# Patient Record
Sex: MALE | Race: WHITE | Employment: OTHER | URBAN - METROPOLITAN AREA
[De-identification: names, ages, dates, MRNs, and addresses within clinical notes are randomized per-mention and may not be internally consistent; named-entity substitution may affect disease eponyms.]

---

## 2020-03-23 ENCOUNTER — HOSPITAL ENCOUNTER (INPATIENT)
Facility: CLINIC | Age: 80
LOS: 3 days | Discharge: HOME HEALTH | DRG: 195 | End: 2020-03-26
Attending: EMERGENCY MEDICINE | Admitting: HOSPITALIST
Payer: COMMERCIAL

## 2020-03-23 ENCOUNTER — APPOINTMENT (OUTPATIENT)
Dept: GENERAL RADIOLOGY | Facility: CLINIC | Age: 80
DRG: 195 | End: 2020-03-23
Attending: EMERGENCY MEDICINE
Payer: COMMERCIAL

## 2020-03-23 DIAGNOSIS — J18.9 PNEUMONIA OF LEFT LOWER LOBE DUE TO INFECTIOUS ORGANISM: ICD-10-CM

## 2020-03-23 DIAGNOSIS — J18.9 COMMUNITY ACQUIRED PNEUMONIA OF LEFT LOWER LOBE OF LUNG: Primary | ICD-10-CM

## 2020-03-23 LAB
ANION GAP SERPL CALCULATED.3IONS-SCNC: 3 MMOL/L (ref 3–14)
BASOPHILS # BLD AUTO: 0 10E9/L (ref 0–0.2)
BASOPHILS NFR BLD AUTO: 0 %
BUN SERPL-MCNC: 16 MG/DL (ref 7–30)
CALCIUM SERPL-MCNC: 8.4 MG/DL (ref 8.5–10.1)
CHLORIDE SERPL-SCNC: 103 MMOL/L (ref 94–109)
CO2 SERPL-SCNC: 29 MMOL/L (ref 20–32)
CREAT SERPL-MCNC: 0.98 MG/DL (ref 0.66–1.25)
DIFFERENTIAL METHOD BLD: ABNORMAL
EOSINOPHIL # BLD AUTO: 0 10E9/L (ref 0–0.7)
EOSINOPHIL NFR BLD AUTO: 0 %
ERYTHROCYTE [DISTWIDTH] IN BLOOD BY AUTOMATED COUNT: 13.9 % (ref 10–15)
FLUAV+FLUBV AG SPEC QL: NEGATIVE
FLUAV+FLUBV AG SPEC QL: NEGATIVE
GFR SERPL CREATININE-BSD FRML MDRD: 72 ML/MIN/{1.73_M2}
GLUCOSE SERPL-MCNC: 115 MG/DL (ref 70–99)
HCT VFR BLD AUTO: 39.7 % (ref 40–53)
HGB BLD-MCNC: 13.4 G/DL (ref 13.3–17.7)
INTERPRETATION ECG - MUSE: NORMAL
LACTATE BLD-SCNC: 1.1 MMOL/L (ref 0.7–2)
LYMPHOCYTES # BLD AUTO: 1.3 10E9/L (ref 0.8–5.3)
LYMPHOCYTES NFR BLD AUTO: 12 %
MCH RBC QN AUTO: 34.3 PG (ref 26.5–33)
MCHC RBC AUTO-ENTMCNC: 33.8 G/DL (ref 31.5–36.5)
MCV RBC AUTO: 102 FL (ref 78–100)
MONOCYTES # BLD AUTO: 1.6 10E9/L (ref 0–1.3)
MONOCYTES NFR BLD AUTO: 14 %
NEUTROPHILS # BLD AUTO: 8.3 10E9/L (ref 1.6–8.3)
NEUTROPHILS NFR BLD AUTO: 74 %
PLATELET # BLD AUTO: 151 10E9/L (ref 150–450)
PLATELET # BLD EST: ABNORMAL 10*3/UL
POTASSIUM SERPL-SCNC: 4 MMOL/L (ref 3.4–5.3)
RBC # BLD AUTO: 3.91 10E12/L (ref 4.4–5.9)
RBC MORPH BLD: ABNORMAL
SODIUM SERPL-SCNC: 135 MMOL/L (ref 133–144)
SPECIMEN SOURCE: NORMAL
WBC # BLD AUTO: 11.2 10E9/L (ref 4–11)

## 2020-03-23 PROCEDURE — 25800030 ZZH RX IP 258 OP 636: Performed by: EMERGENCY MEDICINE

## 2020-03-23 PROCEDURE — 83605 ASSAY OF LACTIC ACID: CPT | Performed by: EMERGENCY MEDICINE

## 2020-03-23 PROCEDURE — 87040 BLOOD CULTURE FOR BACTERIA: CPT | Performed by: EMERGENCY MEDICINE

## 2020-03-23 PROCEDURE — 99285 EMERGENCY DEPT VISIT HI MDM: CPT | Mod: 25

## 2020-03-23 PROCEDURE — 96368 THER/DIAG CONCURRENT INF: CPT

## 2020-03-23 PROCEDURE — 71045 X-RAY EXAM CHEST 1 VIEW: CPT

## 2020-03-23 PROCEDURE — 80048 BASIC METABOLIC PNL TOTAL CA: CPT | Performed by: EMERGENCY MEDICINE

## 2020-03-23 PROCEDURE — 93005 ELECTROCARDIOGRAM TRACING: CPT

## 2020-03-23 PROCEDURE — U0001 2019-NCOV DIAGNOSTIC P: HCPCS | Performed by: EMERGENCY MEDICINE

## 2020-03-23 PROCEDURE — 25000128 H RX IP 250 OP 636: Performed by: EMERGENCY MEDICINE

## 2020-03-23 PROCEDURE — 96365 THER/PROPH/DIAG IV INF INIT: CPT

## 2020-03-23 PROCEDURE — 12000000 ZZH R&B MED SURG/OB

## 2020-03-23 PROCEDURE — 96361 HYDRATE IV INFUSION ADD-ON: CPT

## 2020-03-23 PROCEDURE — 36415 COLL VENOUS BLD VENIPUNCTURE: CPT

## 2020-03-23 PROCEDURE — 87804 INFLUENZA ASSAY W/OPTIC: CPT | Performed by: EMERGENCY MEDICINE

## 2020-03-23 PROCEDURE — 85025 COMPLETE CBC W/AUTO DIFF WBC: CPT | Performed by: EMERGENCY MEDICINE

## 2020-03-23 PROCEDURE — 99223 1ST HOSP IP/OBS HIGH 75: CPT | Mod: AI | Performed by: HOSPITALIST

## 2020-03-23 RX ORDER — AMOXICILLIN 250 MG
2 CAPSULE ORAL 2 TIMES DAILY PRN
Status: DISCONTINUED | OUTPATIENT
Start: 2020-03-23 | End: 2020-03-26 | Stop reason: HOSPADM

## 2020-03-23 RX ORDER — GUAIFENESIN/DEXTROMETHORPHAN 100-10MG/5
10 SYRUP ORAL EVERY 4 HOURS PRN
Status: DISCONTINUED | OUTPATIENT
Start: 2020-03-23 | End: 2020-03-26 | Stop reason: HOSPADM

## 2020-03-23 RX ORDER — PROCHLORPERAZINE 25 MG
12.5 SUPPOSITORY, RECTAL RECTAL EVERY 12 HOURS PRN
Status: DISCONTINUED | OUTPATIENT
Start: 2020-03-23 | End: 2020-03-26 | Stop reason: HOSPADM

## 2020-03-23 RX ORDER — PROCHLORPERAZINE MALEATE 5 MG
5 TABLET ORAL EVERY 6 HOURS PRN
Status: DISCONTINUED | OUTPATIENT
Start: 2020-03-23 | End: 2020-03-26 | Stop reason: HOSPADM

## 2020-03-23 RX ORDER — ACETAMINOPHEN 325 MG/1
650 TABLET ORAL EVERY 4 HOURS PRN
Status: DISCONTINUED | OUTPATIENT
Start: 2020-03-23 | End: 2020-03-26 | Stop reason: HOSPADM

## 2020-03-23 RX ORDER — AZITHROMYCIN 500 MG/1
500 INJECTION, POWDER, LYOPHILIZED, FOR SOLUTION INTRAVENOUS ONCE
Status: COMPLETED | OUTPATIENT
Start: 2020-03-23 | End: 2020-03-23

## 2020-03-23 RX ORDER — LIDOCAINE 40 MG/G
CREAM TOPICAL
Status: DISCONTINUED | OUTPATIENT
Start: 2020-03-23 | End: 2020-03-26 | Stop reason: HOSPADM

## 2020-03-23 RX ORDER — AMOXICILLIN 250 MG
1 CAPSULE ORAL 2 TIMES DAILY PRN
Status: DISCONTINUED | OUTPATIENT
Start: 2020-03-23 | End: 2020-03-26 | Stop reason: HOSPADM

## 2020-03-23 RX ORDER — BISACODYL 10 MG
10 SUPPOSITORY, RECTAL RECTAL DAILY PRN
Status: DISCONTINUED | OUTPATIENT
Start: 2020-03-23 | End: 2020-03-26 | Stop reason: HOSPADM

## 2020-03-23 RX ORDER — NALOXONE HYDROCHLORIDE 0.4 MG/ML
.1-.4 INJECTION, SOLUTION INTRAMUSCULAR; INTRAVENOUS; SUBCUTANEOUS
Status: DISCONTINUED | OUTPATIENT
Start: 2020-03-23 | End: 2020-03-26 | Stop reason: HOSPADM

## 2020-03-23 RX ORDER — DIVALPROEX SODIUM 250 MG/1
750 TABLET, EXTENDED RELEASE ORAL 2 TIMES DAILY
COMMUNITY

## 2020-03-23 RX ORDER — AZITHROMYCIN 500 MG/1
500 INJECTION, POWDER, LYOPHILIZED, FOR SOLUTION INTRAVENOUS EVERY 24 HOURS
Status: DISCONTINUED | OUTPATIENT
Start: 2020-03-23 | End: 2020-03-23

## 2020-03-23 RX ORDER — ONDANSETRON 2 MG/ML
4 INJECTION INTRAMUSCULAR; INTRAVENOUS EVERY 6 HOURS PRN
Status: DISCONTINUED | OUTPATIENT
Start: 2020-03-23 | End: 2020-03-26 | Stop reason: HOSPADM

## 2020-03-23 RX ORDER — SODIUM CHLORIDE 9 MG/ML
INJECTION, SOLUTION INTRAVENOUS CONTINUOUS
Status: DISCONTINUED | OUTPATIENT
Start: 2020-03-23 | End: 2020-03-26 | Stop reason: HOSPADM

## 2020-03-23 RX ORDER — CEFTRIAXONE 1 G/1
1 INJECTION, POWDER, FOR SOLUTION INTRAMUSCULAR; INTRAVENOUS ONCE
Status: COMPLETED | OUTPATIENT
Start: 2020-03-23 | End: 2020-03-23

## 2020-03-23 RX ORDER — CEFTRIAXONE 2 G/1
2 INJECTION, POWDER, FOR SOLUTION INTRAMUSCULAR; INTRAVENOUS EVERY 24 HOURS
Status: DISCONTINUED | OUTPATIENT
Start: 2020-03-24 | End: 2020-03-26 | Stop reason: HOSPADM

## 2020-03-23 RX ORDER — ONDANSETRON 4 MG/1
4 TABLET, ORALLY DISINTEGRATING ORAL EVERY 6 HOURS PRN
Status: DISCONTINUED | OUTPATIENT
Start: 2020-03-23 | End: 2020-03-26 | Stop reason: HOSPADM

## 2020-03-23 RX ADMIN — AZITHROMYCIN MONOHYDRATE 500 MG: 500 INJECTION, POWDER, LYOPHILIZED, FOR SOLUTION INTRAVENOUS at 21:25

## 2020-03-23 RX ADMIN — SODIUM CHLORIDE 1000 ML: 9 INJECTION, SOLUTION INTRAVENOUS at 19:33

## 2020-03-23 RX ADMIN — CEFTRIAXONE SODIUM 1 G: 1 INJECTION, POWDER, FOR SOLUTION INTRAMUSCULAR; INTRAVENOUS at 21:15

## 2020-03-23 ASSESSMENT — ENCOUNTER SYMPTOMS
VOMITING: 0
APPETITE CHANGE: 1
ABDOMINAL PAIN: 0
FEVER: 0
DIARRHEA: 0
CHILLS: 0
COUGH: 1
WEAKNESS: 1

## 2020-03-23 ASSESSMENT — MIFFLIN-ST. JEOR
SCORE: 1566.28
SCORE: 1566.28

## 2020-03-23 NOTE — LETTER
Transition Communication Hand-off for Care Transitions to Next Level of Care Provider    Name: Dave Rincon  : 1940  MRN #: [id number]  Primary Care Provider: Ava Wooten     Primary Clinic: [facility] [address]    [facility] [address]  Primary Care Clinic Name: Clinic, [facility]  Reason for Hospitalization:  Pneumonia of left lower lobe due to infectious organism (H) [J18.1], COVID negative  Admit Date/Time: 3/23/2020  7:08 PM  Discharge Date: 3/26/20  Payor Source: Payor: COMMERCIAL / Plan: [facility] / Product Type: Indemnity /     Readmission Assessment Measure (ANGEL LUIS) Risk Score/category: Average           Reason for Communication Hand-off Referral: Avoidable readmission within 30 days    Discharge Plan:       Concern for non-adherence with plan of care:   Y/N no  Discharge Needs Assessment:  Needs      Most Recent Value   Equipment Currently Used at Home  none   # of Referrals Placed by CTS  Senior Linkage Line, Communication hand-offs to next level of Care Providers          Follow-up specialty is recommended: No    Follow-up plan: Post hospitalization visit with PCP within a week no labs recommended.   You have a Follow up Appointment with Dr. Langston (Dr. Wooten not available on vacation)at [facility] on Monday the  at 1PM.        Any outstanding tests or procedures:        Referrals     Future Labs/Procedures    Home care nursing referral     Comments:    RN skilled nursing visit. RN to assess respiratory and cardiac status, pain level and activity tolerance and home safety.    Your provider has ordered home care nursing services. If you have not been contacted within 2 days of your discharge please call the inpatient department phone number at [phone] .    Home Care OT Referral for Hospital Discharge     Comments:    OT to eval and treat    Your provider has ordered home care - occupational therapy. If you have not been  contacted within 2 days of your discharge please call the department phone number listed on the top of this document.    Home Care PT Referral for Hospital Discharge     Comments:    PT to eval and treat    Your provider has ordered home care - physical therapy. If you have not been contacted within 2 days of your discharge please call the department phone number listed on the top of this document.    Discharge to home with [facility] services. The staff will call to schedule the first visit. Their phone number is [phone].            [employer] Recommendations:      Candace Bell RN    AVS/Discharge Summary is the source of truth; this is a helpful guide for improved communication of patient story

## 2020-03-24 LAB
ANION GAP SERPL CALCULATED.3IONS-SCNC: 5 MMOL/L (ref 3–14)
BASOPHILS # BLD AUTO: 0 10E9/L (ref 0–0.2)
BASOPHILS NFR BLD AUTO: 0 %
BUN SERPL-MCNC: 15 MG/DL (ref 7–30)
CALCIUM SERPL-MCNC: 8 MG/DL (ref 8.5–10.1)
CHLORIDE SERPL-SCNC: 107 MMOL/L (ref 94–109)
CO2 SERPL-SCNC: 27 MMOL/L (ref 20–32)
CREAT SERPL-MCNC: 0.97 MG/DL (ref 0.66–1.25)
DIFFERENTIAL METHOD BLD: ABNORMAL
EOSINOPHIL # BLD AUTO: 0.1 10E9/L (ref 0–0.7)
EOSINOPHIL NFR BLD AUTO: 1 %
ERYTHROCYTE [DISTWIDTH] IN BLOOD BY AUTOMATED COUNT: 14.2 % (ref 10–15)
GFR SERPL CREATININE-BSD FRML MDRD: 73 ML/MIN/{1.73_M2}
GLUCOSE SERPL-MCNC: 102 MG/DL (ref 70–99)
GRAM STN SPEC: NORMAL
HCT VFR BLD AUTO: 35.7 % (ref 40–53)
HGB BLD-MCNC: 11.9 G/DL (ref 13.3–17.7)
LYMPHOCYTES # BLD AUTO: 1.6 10E9/L (ref 0.8–5.3)
LYMPHOCYTES NFR BLD AUTO: 18 %
Lab: NORMAL
MCH RBC QN AUTO: 34.5 PG (ref 26.5–33)
MCHC RBC AUTO-ENTMCNC: 33.3 G/DL (ref 31.5–36.5)
MCV RBC AUTO: 104 FL (ref 78–100)
METAMYELOCYTES # BLD: 0.1 10E9/L
METAMYELOCYTES NFR BLD MANUAL: 1 %
MONOCYTES # BLD AUTO: 1 10E9/L (ref 0–1.3)
MONOCYTES NFR BLD AUTO: 11 %
NEUTROPHILS # BLD AUTO: 6.2 10E9/L (ref 1.6–8.3)
NEUTROPHILS NFR BLD AUTO: 69 %
PLATELET # BLD AUTO: 147 10E9/L (ref 150–450)
PLATELET # BLD EST: ABNORMAL 10*3/UL
POTASSIUM SERPL-SCNC: 4.1 MMOL/L (ref 3.4–5.3)
RBC # BLD AUTO: 3.45 10E12/L (ref 4.4–5.9)
RBC MORPH BLD: ABNORMAL
SODIUM SERPL-SCNC: 139 MMOL/L (ref 133–144)
SPECIMEN SOURCE: NORMAL
WBC # BLD AUTO: 9 10E9/L (ref 4–11)

## 2020-03-24 PROCEDURE — 25000132 ZZH RX MED GY IP 250 OP 250 PS 637: Performed by: HOSPITALIST

## 2020-03-24 PROCEDURE — 25800030 ZZH RX IP 258 OP 636: Performed by: HOSPITALIST

## 2020-03-24 PROCEDURE — 87205 SMEAR GRAM STAIN: CPT | Performed by: HOSPITALIST

## 2020-03-24 PROCEDURE — 25000128 H RX IP 250 OP 636: Performed by: HOSPITALIST

## 2020-03-24 PROCEDURE — 99233 SBSQ HOSP IP/OBS HIGH 50: CPT | Performed by: INTERNAL MEDICINE

## 2020-03-24 PROCEDURE — 87070 CULTURE OTHR SPECIMN AEROBIC: CPT | Performed by: HOSPITALIST

## 2020-03-24 PROCEDURE — 85025 COMPLETE CBC W/AUTO DIFF WBC: CPT | Performed by: HOSPITALIST

## 2020-03-24 PROCEDURE — 25800030 ZZH RX IP 258 OP 636: Performed by: INTERNAL MEDICINE

## 2020-03-24 PROCEDURE — 80048 BASIC METABOLIC PNL TOTAL CA: CPT | Performed by: HOSPITALIST

## 2020-03-24 PROCEDURE — 36415 COLL VENOUS BLD VENIPUNCTURE: CPT | Performed by: HOSPITALIST

## 2020-03-24 PROCEDURE — 12000000 ZZH R&B MED SURG/OB

## 2020-03-24 RX ADMIN — ENOXAPARIN SODIUM 40 MG: 40 INJECTION SUBCUTANEOUS at 22:00

## 2020-03-24 RX ADMIN — GUAIFENESIN AND DEXTROMETHORPHAN 10 ML: 100; 10 SYRUP ORAL at 21:54

## 2020-03-24 RX ADMIN — ACETAMINOPHEN 650 MG: 325 TABLET, FILM COATED ORAL at 10:51

## 2020-03-24 RX ADMIN — CEFTRIAXONE 2 G: 2 INJECTION, POWDER, FOR SOLUTION INTRAMUSCULAR; INTRAVENOUS at 21:51

## 2020-03-24 RX ADMIN — SODIUM CHLORIDE: 9 INJECTION, SOLUTION INTRAVENOUS at 00:21

## 2020-03-24 RX ADMIN — ENOXAPARIN SODIUM 40 MG: 40 INJECTION SUBCUTANEOUS at 00:21

## 2020-03-24 RX ADMIN — DIVALPROEX SODIUM 750 MG: 250 TABLET, FILM COATED, EXTENDED RELEASE ORAL at 20:50

## 2020-03-24 RX ADMIN — AZITHROMYCIN MONOHYDRATE 250 MG: 500 INJECTION, POWDER, LYOPHILIZED, FOR SOLUTION INTRAVENOUS at 20:49

## 2020-03-24 RX ADMIN — SODIUM CHLORIDE: 9 INJECTION, SOLUTION INTRAVENOUS at 09:21

## 2020-03-24 RX ADMIN — SODIUM CHLORIDE: 9 INJECTION, SOLUTION INTRAVENOUS at 19:11

## 2020-03-24 RX ADMIN — DIVALPROEX SODIUM 750 MG: 250 TABLET, FILM COATED, EXTENDED RELEASE ORAL at 09:20

## 2020-03-24 ASSESSMENT — ACTIVITIES OF DAILY LIVING (ADL)
SWALLOWING: 0-->SWALLOWS FOODS/LIQUIDS WITHOUT DIFFICULTY
ADLS_ACUITY_SCORE: 19
RETIRED_COMMUNICATION: 2-->DIFFICULTY UNDERSTANDING (NOT RELATED TO LANGUAGE BARRIER)
TRANSFERRING: 0-->INDEPENDENT
ADLS_ACUITY_SCORE: 14
AMBULATION: 0-->INDEPENDENT
BATHING: 0-->INDEPENDENT
ADLS_ACUITY_SCORE: 16
TOILETING: 0-->INDEPENDENT
FALL_HISTORY_WITHIN_LAST_SIX_MONTHS: YES
DRESS: 0-->INDEPENDENT
RETIRED_EATING: 0-->INDEPENDENT
ADLS_ACUITY_SCORE: 16
ADLS_ACUITY_SCORE: 16
ADLS_ACUITY_SCORE: 14
COGNITION: 0 - NO COGNITION ISSUES REPORTED
WHICH_OF_THE_ABOVE_FUNCTIONAL_RISKS_HAD_A_RECENT_ONSET_OR_CHANGE?: AMBULATION;FALL HISTORY

## 2020-03-24 ASSESSMENT — MIFFLIN-ST. JEOR: SCORE: 1638.13

## 2020-03-24 NOTE — PHARMACY-ADMISSION MEDICATION HISTORY
Pharmacy Medication History  Admission medication history interview status for the 3/23/2020  admission is complete. See EPIC admission navigator for prior to admission medications     [facility]   Medication history source reliability:   Adherence assessment:     Significant changes made to the medication list:  -Only Depakote ER is listed on [facility].      Additional medication history information:       Medication reconciliation completed by provider prior to medication history?     Time spent in this activity:       Prior to Admission medications    Medication Sig Last Dose Taking? Auth Provider   divalproex sodium extended-release (DEPAKOTE ER) 250 MG 24 hr tablet Take 750 mg by mouth 2 times daily Last released 10/16/19 for 90 day supply [facility].   Take 3 tablets twice a day.  Yes Unknown, Entered By History

## 2020-03-24 NOTE — ED NOTES
Bed: ED08  Expected date:   Expected time:   Means of arrival:   Comments:  518-79M SOB/Cough/Fever

## 2020-03-24 NOTE — PROGRESS NOTES
RECEIVING UNIT ED HANDOFF REVIEW    ED Nurse Handoff Report was reviewed by: Lulú Sanchez RN on March 23, 2020 at 10:27 PM

## 2020-03-24 NOTE — ED PROVIDER NOTES
"  History     Chief Complaint:  Cough and Weakness      HPI   Dave Rincon is a 79 year old male presenting from home with a cough and generalized weakness. He lives at home with his wife. He has not been around anyone that's been ill. He states that he has had no appetite over the past few days. He was very weak today and could not get up and walk as normally. He denies any falls. 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 He states he does not smoke or have any history of lung disease. He did get his flu shot last year. He denies any chest pain or abdominal pain. He cannot tell me whether he feels short of breath when he walks around as he is not certain. He denies any diarrhea or vomiting. He denies any chills or fever.       Allergies:  No known drug allergies    Medications:    Depakote    Past Medical History:    Benign brain tumor    Past Surgical History:    The patient does not have any past pertinent medical history.    Family History:    History reviewed. No pertinent family history.     Social History:  Smoking status: never smoker  The patient presents to the emergency department via EMS.  Marital Status:      Review of Systems   Constitutional: Positive for appetite change. Negative for chills and fever.   Respiratory: Positive for cough.    Cardiovascular: Negative for chest pain.   Gastrointestinal: Negative for abdominal pain, diarrhea and vomiting.   Neurological: Positive for weakness.   All other systems reviewed and are negative.    Physical Exam     Patient Vitals for the past 24 hrs:   BP Temp Temp src Pulse Resp SpO2 Height Weight   03/23/20 2100 (!) 144/78 -- -- 87 -- 94 % -- --   03/23/20 2030 (!) 156/81 -- -- 79 -- 93 % -- --   03/23/20 2000 (!) 146/80 -- -- 79 -- 94 % -- --   03/23/20 1918 137/79 98.5  F (36.9  C) Oral 86 18 93 % 1.803 m (5' 11\") 83.9 kg (185 lb)   03/23/20 1916 137/79 98.5  F " "(36.9  C) Oral 85 18 94 % 1.803 m (5' 11\") 83.9 kg (185 lb)       Physical Exam    Physical Exam   Constitutional:  Patient is oriented to person, place, and time. They appear well-developed and well-nourished. Mild distress secondary to cough and weakness. Hard of hearing  HENT:   Mouth/Throat:   Oropharynx is clear and moist.   Eyes:    Conjunctivae normal and EOM are normal. Pupils are equal, round, and reactive to light.   Neck:    Normal range of motion.   Cardiovascular: Normal rate, regular rhythm and normal heart sounds.  Exam reveals no gallop and no friction rub.  No murmur heard.  Pulmonary/Chest:  Diminished breath sounds bilaterally without wheezing, rales, or rhonchi.   Abdominal:   Soft. Bowel sounds are normal. Patient exhibits no mass. There is no tenderness. There is no rebound and no guarding.   Musculoskeletal:  Normal range of motion. Patient exhibits no edema.   Neurological:   Patient is alert and oriented to person, place, and time. Patient is generally very weak. No cranial nerve deficit or sensory deficit. GCS 15  Skin:   Skin is warm and dry. No rash noted. No erythema.   Psychiatric:   Patient has a normal mood      Emergency Department Course   ECG (20:38:38):  Rate 82 bpm. NM interval 128. QRS duration 90. QT/QTc 384/448. P-R-T axes 41 -31 50. Normal sinus rhythm. Left axis deviation. Abnormal ECG. Interpreted at 2048 by Julieta Vyas MD.    Imaging:  Radiographic findings were communicated with the patient who voiced understanding of the findings.  XR Chest 1 view, portable:   IMPRESSION: Hazy opacity in the left lower lung is suspicious for   pneumonia. The right lung is clear. Aortic calcification. No   pneumothorax. Pulmonary vascularity is within normal limits. as per radiology.    Laboratory:  CBC: WBC: 11.2 (H), HGB: 13.4, PLT: 151  BMP: Glucose 115 (H), Calcium 8.4 (L), o/w WNL (Creatinine: 0.98)  1930 Lactic acid: 1.1  Influenza A/B antigen: negative  COVID-19 swab: " pending  Blood cultures (X2): pending    Interventions:  1933 NS 1000 mL IV  2115 Rocephin 1 g IV  2125 azithromycin 500 mg IV    Emergency Department Course:  Nursing notes and vitals reviewed. (1912) I performed an exam of the patient as documented above.     IV inserted. Medicine administered as documented above. Blood drawn. Flu and COVID-19 swab obtained. This was sent to the lab for further testing, results above.     The patient was sent for a xray while in the emergency department, findings above.     An EKG was recorded. Results as noted above.     2059 I rechecked the patient and discussed the results of his workup thus far.     2120  I consulted with Dr. Aguilar of the hospitalist services. He is in agreement to accept the patient for admission.    Findings and plan explained to the Patient who consents to admission. Discussed the patient with Dr. Aguilar, who will admit the patient to a inpatient adult med/surg isolation bed for further monitoring, evaluation, and treatment.    Impression & Plan    Medical Decision Making:  Dave Rincon is a 79 year old male who presents to the emergency department with cough and progressive weakness. His initial vitals sign show him to be afebrile with a normal blood pressure and heart rate. He is not hypoxic. He was very weak on exam, requiring assistance just to sit up. I did influenza swab him and this was negative. I also did COVID due to his age and condition I do have a reasonable level of suspicion for this. This is pending. His white blood cell count is slightly elevated. He has no acute metabolic derangement, his lactic acid is normal, and blood cultures are pending. His chest xray does show an opacity in the left lower lung suspicious for pneumonia. At this time I will treat him for community acquired pneumonia and admit him to the hospital. Again, COVID is still pending we will place him in an isolation bed..      Diagnosis:    ICD-10-CM    1. Pneumonia  of left lower lobe due to infectious organism (H)  J18.1     COVID rule out       Disposition:  Admitted to inpatient med/surg  Cal Monroy  3/23/2020    EMERGENCY DEPARTMENT  Scribe Disclosure:  I, Cal Monroy, am serving as a scribe at 7:12 PM on 3/23/2020 to document services personally performed by Julieta Vyas MD based on my observations and the provider's statements to me.        Julieta Vyas MD  03/23/20 6028

## 2020-03-24 NOTE — ED NOTES
"[facility]  ED Nurse Handoff Report    ED Chief complaint: Cough      ED Diagnosis:   Final diagnoses:   Pneumonia of left lower lobe due to infectious organism (H) - COVID rule out       Code Status: Full Code    Allergies: Allergies not on file    Patient Story: cough  Focused Assessment:  Patient with complex medical history presents to ED for 2-3 days of cough and worsening weakness. Patient reports he has been too weak to care for himself, his wife who had dementia tried to help him but did not work out since he was too weak and kept falling when attempted to get up today.     Treatments and/or interventions provided: Rocephin, azithromycin, NS bolus  Patient's response to treatments and/or interventions: will monitor    To be done/followed up on inpatient unit:  close monitor    Does this patient have any cognitive concerns?: na    Activity level - Baseline/Home:  Stand with Assist  Activity Level - Current:   Stand with Assist    Patient's Preferred language: Data Unavailable   Needed?: No    Isolation: contact, droplet, airborne  Infection: COVID rule out  Bariatric?: No    Vital Signs:   Vitals:    03/23/20 1918 03/23/20 2000 03/23/20 2030 03/23/20 2100   BP: 137/79 (!) 146/80 (!) 156/81 (!) 144/78   Pulse: 86 79 79 87   Resp: 18      Temp: 98.5  F (36.9  C)      TempSrc: Oral      SpO2: 93% 94% 93% 94%   Weight: 83.9 kg (185 lb)      Height: 1.803 m (5' 11\")          Cardiac Rhythm:     Was the PSS-3 completed:   Yes  What interventions are required if any?               Family Comments: NA  OBS brochure/video discussed/provided to patient/family: N/A              Name of person given brochure if not patient: na              Relationship to patient: na    For the majority of the shift this patient's behavior was Green.   Behavioral interventions performed were none.    ED NURSE PHONE NUMBER: *[id number]         "

## 2020-03-24 NOTE — PROGRESS NOTES
"[facility]    Medicine Progress Note - Hospitalist Service        Date of Admission:  3/23/2020  7:08 PM    Assessment & Plan:   Dave Rincon is a 79 year old male with PMH significant for remote history of benign brain tumor (per patient) who presented to ER with complaints of generalized weakness, productive cough and probable left-sided pneumonia     Generalized weakness likely secondary to left-sidedcommunity acquired  pneumonia  -fever of 100.2, has productive cough, WBC 11.2  -chest x-ray with left lower lobe hazy opacity  -nfluenza negative,  -rule out COVID-19  -continue Rocephin and azithromycin  -IV hydration with normal saline at 75 ml/hr  -monitor respiratory status closely  -PT evaluation  -PRN cough medications     Remote history of benign brain tumor  -Continue PTA Depakote       Diet: Combination Diet Regular Diet Adult     DVT Prophylaxis: Pneumatic Compression Devices   Ayon Catheter: not present  Code Status: Full Code     Disposition Plan    Expected discharge: 2 - 3 days, recommended to TBD   Entered: Saul Macdonald MD 03/24/2020, 12:53 PM        The patient's care was discussed with the Bedside Nurse and Patient.    Saul Macdonald MD  Hospitalist Service  [facility]    ______________________________________________________________________    Interval History   Ongoing cough. Denies dyspnea. Temp of 100.2.     Data reviewed today: I reviewed all medications, new labs and imaging results over the last 24 hours. I personally reviewed no images or EKG's today.    Physical Exam   Vital signs:  Temp: 99.5  F (37.5  C) Temp src: Oral BP: 125/65 Pulse: 74   Resp: 30 SpO2: 92 % O2 Device: None (Room air)   Height: 180.3 cm (5' 11\") Weight: 91.1 kg (200 lb 13.4 oz)  Estimated body mass index is 28.01 kg/m  as calculated from the following:    Height as of this encounter: 1.803 m (5' 11\").    Weight as of this encounter: 91.1 kg (200 lb 13.4 oz).      Wt " Readings from Last 2 Encounters:   03/24/20 91.1 kg (200 lb 13.4 oz)       Gen: AAOX2-3, NAD, ? Slightly forgetful  HEENT: no pallor  Resp: coarse BS b/l,  no wheezes  CVS: RRR, no murmur  Abd/GI: Soft, non-tender. BS- normoactive.   Skin: Warm, dry no rashes  MSK:  no pedal edema  Neuro- CN- intact. No focal deficits.       Data   Recent Labs   Lab 03/24/20  0914 03/23/20  1930   WBC 9.0 11.2*   HGB 11.9* 13.4   * 102*   * 151    135   POTASSIUM 4.1 4.0   CHLORIDE 107 103   CO2 27 29   BUN 15 16   CR 0.97 0.98   ANIONGAP 5 3   JAGDEEP 8.0* 8.4*   * 115*       Recent Results (from the past 24 hour(s))   XR Chest Port 1 View    Narrative    CHEST ONE VIEW PORTABLE   3/23/2020 8:29 PM     HISTORY:  Cough.    COMPARISON: None.      Impression    IMPRESSION: Hazy opacity in the left lower lung is suspicious for  pneumonia. The right lung is clear. Aortic calcification. No  pneumothorax. Pulmonary vascularity is within normal limits.    ANTHONY DAVIES MD     Medications     sodium chloride 100 mL/hr at 03/24/20 0921       azithromycin  250 mg Intravenous Q24H     cefTRIAXone  2 g Intravenous Q24H     divalproex sodium extended-release  750 mg Oral BID     enoxaparin ANTICOAGULANT  40 mg Subcutaneous Q24H     sodium chloride (PF)  3 mL Intracatheter Q8H

## 2020-03-24 NOTE — PLAN OF CARE
DATE & TIME: 3/24/2020  9582-4903                    Cognitive Concerns/ Orientation : A & O x 4   BEHAVIOR & AGGRESSION TOOL COLOR: Green  CIWA SCORE: N/a    ABNL VS/O2: /69 T. 100.2 recheck after Tylenol 99.5,o2 91% ra  lung sound coarse with exp. Wheezes, sob with activities and tachypeic at times   MOBILITY: Assist x 1 W/GB   PAIN MANAGMENT: Denies pain  DIET: Regular  BOWEL/BLADDER: continent. Pt wearing brief, urinal at bedside.   ABNL LAB/BG: WBC 9.0 hgb 11.9  DRAIN/DEVICES: PIV right arm, NS infusing at 75mL/hr.   TELEMETRY RHYTHM: N/a  SKIN: Bruised but intact  TESTS/PROCEDURES: N/a  D/C DAY/GOALS/PLACE: 2-3 days  OTHER IMPORTANT INFO: [place], hearing aids left at home. Covid r/o, result pending. He is on iv azithromycin and iv rocephin Q24

## 2020-03-24 NOTE — H&P
[facility]  History and Physical   Hospitalist  Nikhil Flowers MD       Dave Rincon MRN# [id number]   YOB: 1940 Age: 79 year old      Date of Admission:  3/23/2020         Assessment and Plan:   Dave Rincon is a 79 year old male with PMH significant for remote history of benign brain tumor (per patient, unable to verify any documentation) who presented to ER with complaints of generalized weakness, productive cough and noted with probable left-sided pneumonia    #Generalized weakness likely secondary to community acquired left-sided pneumonia  -is afebrile, has productive cough, WBC 11.2  -chest x-ray noted with left lower lobe hazy opacity  -will admit as inpatient, influenza negative, also being ruled out for COVID  -continue Rocephin and azithromycin  -IV hydration with normal saline at 100 ml/hr  -PT evaluation  -PRN cough medications, monitor CBC BMP    # Remote history of benign brain tumor (per patient, unable to verify any documentation)  -he reports taking Depakote, resume when verified by pharmacy    # DVT prophylaxis: enoxaparin  # Code status: full code           Primary Care Physician:   No primary care provider on file. None         Chief Complaint:   generalized weakness, cough    History is obtained from the patient         History of Present Illness:   Dave Rincon is a 79 year old male with PMH significant for remote history of benign brain tumor (per patient, unable to verify any documentation) who presented to ER with complaints of generalized weakness, productive cough. He states for past couple days he has been having some productive cough and has been feeling overall weak.     The patient denies any fever, chills, rigors, chest pain or shortness of breath.  Denies pain abdomen.  No bowel or bladder disturbances.    In ED patient was seen by Dr Vyas, routine workup was noted with probable left-sided pneumonia, also been ruled out for COVID ;  "hospitalist was requested admission for further evaluation.               Past Medical History:     # Remote history of benign brain tumor (per patient, unable to verify any documentation)          Past Surgical History:   No past surgical history on file.           Home Medications:     None   Depakote, to be verified by pharmacy         Allergies:   Allergies not on file         Social History:   Dave Rincon   quit smoking 2018, reports occasional alcohol use, no illicit drug use              Family History:   Dave Rincon family history is not on file.    Family history was reviewed by myself and not pertinent to current presentation.           Review of Systems:   A10 point Review of Systems was done and were negative other than noted in the HPI.             Physical Exam:   Blood pressure (!) 144/78, pulse 87, temperature 98.5  F (36.9  C), temperature source Oral, resp. rate 18, height 1.803 m (5' 11\"), weight 83.9 kg (185 lb), SpO2 94 %.  185 lbs 0 oz        Constitutional: Alert, awake and orienetd X 3; lying comfortably in bed in no apparent distress; very hard of hearing ; looks fatigued    HEENT: Pupils equal and reactive to light and accomodation, EOMI intact; neck supple no raised JVD or rigidity    Oral cavity:  dry oral mucosa   Cardiovascular: Normal s1 s2, regular rate and rhythm, no murmur   Lungs: B/l clear to auscultation, no wheezes or crepitations   Abdomen: Soft, nt, nd, no guarding, rigidity or rebound; BS +   LE : No edema   Musculoskeletal: Power 5/5 in all extremities   Neuro: No focal neurological deficits noted, CN II to XII grossly intact   Psychiatry: normal mood and affect  Skin: No obvious skin rashes or ulcers             Data:   All new lab and imaging data was reviewed in Epic.   Significant labs and imagings include:    WBC 11.2  normal lactate  influenza negative  COVID pending  chest x-ray reviewed by myself shows left lower lobe hazy opacity             Nikhil Flowers, " MD  Hospitalist

## 2020-03-24 NOTE — PLAN OF CARE
DATE & TIME: 3/24/2020 [id number]                     Cognitive Concerns/ Orientation : A & O x 4   BEHAVIOR & AGGRESSION TOOL COLOR: Green  CIWA SCORE: N/a    ABNL VS/O2: VSS, O2 92% RA, Tachypneic at times. LS clear dim, mild ATWOOD. Intermittent nonproductive coughs.   MOBILITY: SBA with Gait belt  PAIN MANAGMENT: Denies pain  DIET: Regular  BOWEL/BLADDER: continent, BM x1 today  ABNL LAB/BG: WBC 9.0 (11.2 yesterday 3/23/20), Hgb 11.9 (13.4)  DRAIN/DEVICES: PIV right arm, NS infusing at 75 mL/hr.   TELEMETRY RHYTHM: N/a  SKIN: Bruised but intact  TESTS/PROCEDURES: N/a  D/C DAY/GOALS/PLACE: TBD  OTHER IMPORTANT INFO: Covid 19 r/o. [place], hearing aids left at home, pocket talker at bedside. On IV Rocep and Azithromycin q24h. Collected sputum sample, result pending.

## 2020-03-24 NOTE — PROGRESS NOTES
DATE & TIME: 3/24/2020  [id number]    Cognitive Concerns/ Orientation : A & O x 4   BEHAVIOR & AGGRESSION TOOL COLOR: Green  CIWA SCORE: N/a   ABNL VS/O2: /68  MOBILITY: Assist x 1 W/GB  PAIN MANAGMENT: Denies pain  DIET: Regular  BOWEL/BLADDER: Sometimes incontinent. Pt wearing brief, urinal at bedside.   ABNL LAB/BG: WBC 11.2  DRAIN/DEVICES: PIV right arm, NS infusing at 100 mL/hr.   TELEMETRY RHYTHM: N/a  SKIN: Bruised but intact  TESTS/PROCEDURES: N/a  D/C DAY/GOALS/PLACE: D  OTHER IMPORTANT INFO: [place], hearing aids left at home. He is open to using a pocket talker, but was unable to locate one.

## 2020-03-24 NOTE — PLAN OF CARE
PT: Orders received, chart reviewed, spoke with RN. Pt admitted with pneumonia, COVID-19 rule out. Pt states he is tired and just got back into bed and would like to rest. Agreeable to PT eval tomorrow.

## 2020-03-24 NOTE — ED TRIAGE NOTES
Productive cough and weakness for last 2-3 days. Unable to care for self, wife has dementia . Been too weak to get upstairs and kept falling when attempted to get up. Reports not been eating and drinking for days. EMS reports house is under complete disarray.

## 2020-03-25 ENCOUNTER — APPOINTMENT (OUTPATIENT)
Dept: PHYSICAL THERAPY | Facility: CLINIC | Age: 80
DRG: 195 | End: 2020-03-25
Attending: HOSPITALIST
Payer: COMMERCIAL

## 2020-03-25 LAB
ERYTHROCYTE [DISTWIDTH] IN BLOOD BY AUTOMATED COUNT: 13.8 % (ref 10–15)
HCT VFR BLD AUTO: 36.1 % (ref 40–53)
HGB BLD-MCNC: 11.9 G/DL (ref 13.3–17.7)
MCH RBC QN AUTO: 33.8 PG (ref 26.5–33)
MCHC RBC AUTO-ENTMCNC: 33 G/DL (ref 31.5–36.5)
MCV RBC AUTO: 103 FL (ref 78–100)
PLATELET # BLD AUTO: 162 10E9/L (ref 150–450)
RBC # BLD AUTO: 3.52 10E12/L (ref 4.4–5.9)
WBC # BLD AUTO: 9 10E9/L (ref 4–11)

## 2020-03-25 PROCEDURE — 36415 COLL VENOUS BLD VENIPUNCTURE: CPT | Performed by: INTERNAL MEDICINE

## 2020-03-25 PROCEDURE — 25000128 H RX IP 250 OP 636: Performed by: HOSPITALIST

## 2020-03-25 PROCEDURE — 12000000 ZZH R&B MED SURG/OB

## 2020-03-25 PROCEDURE — 97530 THERAPEUTIC ACTIVITIES: CPT | Mod: GP

## 2020-03-25 PROCEDURE — 25800030 ZZH RX IP 258 OP 636: Performed by: HOSPITALIST

## 2020-03-25 PROCEDURE — 99232 SBSQ HOSP IP/OBS MODERATE 35: CPT | Performed by: HOSPITALIST

## 2020-03-25 PROCEDURE — 85027 COMPLETE CBC AUTOMATED: CPT | Performed by: INTERNAL MEDICINE

## 2020-03-25 PROCEDURE — 97161 PT EVAL LOW COMPLEX 20 MIN: CPT | Mod: GP

## 2020-03-25 PROCEDURE — 25000132 ZZH RX MED GY IP 250 OP 250 PS 637: Performed by: HOSPITALIST

## 2020-03-25 RX ADMIN — AZITHROMYCIN MONOHYDRATE 250 MG: 500 INJECTION, POWDER, LYOPHILIZED, FOR SOLUTION INTRAVENOUS at 21:13

## 2020-03-25 RX ADMIN — CEFTRIAXONE 2 G: 2 INJECTION, POWDER, FOR SOLUTION INTRAMUSCULAR; INTRAVENOUS at 20:42

## 2020-03-25 RX ADMIN — GUAIFENESIN AND DEXTROMETHORPHAN 10 ML: 100; 10 SYRUP ORAL at 06:26

## 2020-03-25 RX ADMIN — DIVALPROEX SODIUM 750 MG: 250 TABLET, FILM COATED, EXTENDED RELEASE ORAL at 20:42

## 2020-03-25 RX ADMIN — DIVALPROEX SODIUM 750 MG: 250 TABLET, FILM COATED, EXTENDED RELEASE ORAL at 08:36

## 2020-03-25 ASSESSMENT — ACTIVITIES OF DAILY LIVING (ADL)
ADLS_ACUITY_SCORE: 16

## 2020-03-25 ASSESSMENT — MIFFLIN-ST. JEOR: SCORE: 1637.13

## 2020-03-25 NOTE — PLAN OF CARE
DATE & TIME: 3/24/2020 9289-4238                  Cognitive Concerns/ Orientation : A & O x 4   BEHAVIOR & AGGRESSION TOOL COLOR: Green  CIWA SCORE: N/a    ABNL VS/O2: VSS ex HTN on RA. LS clear dim, mild ATWOOD. congestive cough PRN Robitussin given x1   MOBILITY: Ax1 with GB, walker  PAIN MANAGMENT: Denies pain  DIET: Regular  BOWEL/BLADDER: continent, BM x1 this shift  ABNL LAB/BG: WBC 9.0 (11.2 yesterday 3/23/20), Hgb 11.9 (13.4)  DRAIN/DEVICES: PIV right arm, NS infusing at 75 mL/hr.   TELEMETRY RHYTHM: N/a  SKIN: Bruised but intact  TESTS/PROCEDURES: N/a  D/C DAY/GOALS/PLACE: TBD  OTHER IMPORTANT INFO: Covid 19 result negative, isolation precautions discontinued. [place], hearing aids left at home, pocket talker at bedside. On IV Rocep and Azithromycin q24h. Sputum sample, result pending. Continue to monitor

## 2020-03-25 NOTE — PLAN OF CARE
"PT:  Discharge Planner PT   Patient plan for discharge: Return home  Current status: Currently pt requires SBA for transfer bed to chair, was independent with bed mobility and transfers without AD. Pt states he feels as though he \"can't get the air out of my lungs.\" Pt on 2L with sats 98% at rest and with transfer to chair. Pt sounds wheezy. Pt demonstrates dec activity tolerance, difficulty breathing and difficulty ambulating and would benefit from skilled PT services in order to improve this.  Barriers to return to prior living situation: Dec activity tolerance, new O2 need  Recommendations for discharge: Anticipate pt will be able to return home at discharge when his breathing is improved.  Rationale for recommendations: Pt demonstrates good strength and balance with bed to chair transfer. Will assess further with gait assessment in the hallway.       Entered by: Dania March 03/25/2020 9:59 AM       "

## 2020-03-25 NOTE — PROGRESS NOTES
03/25/20 0929   Quick Adds   Type of Visit Initial PT Evaluation   Living Environment   Lives With spouse   Living Arrangements house   Home Accessibility stairs within home   Number of Stairs, Within Home, Primary 10   Stair Railings, Within Home, Primary railing on right side (ascending)   Living Environment Comment Pt's bedroom and shower are in the basement level   Self-Care   Usual Activity Tolerance moderate   Current Activity Tolerance fair   Regular Exercise No   Equipment Currently Used at Home none   Functional Level Prior   Ambulation 0-->independent   Transferring 0-->independent   Fall history within last six months yes   Number of times patient has fallen within last six months 1   Which of the above functional risks had a recent onset or change? ambulation;transferring   General Information   Onset of Illness/Injury or Date of Surgery - Date 03/23/20   Referring Physician Dave Keller MD   Patient/Family Goals Statement Return home   Pertinent History of Current Problem (include personal factors and/or comorbidities that impact the POC) Pt admitted with weakness and pneumonia. PMH: remote history of a brain tumor.   Precautions/Limitations fall precautions;oxygen therapy device and L/min  (none at baseline, currently on 2L)   Weight-Bearing Status - LLE full weight-bearing   Weight-Bearing Status - RLE full weight-bearing   General Info Comments Pt states he care for his wife and does the driving, cooking, most chores.   Cognitive Status Examination   Orientation orientation to person, place and time   Level of Consciousness alert   Follows Commands and Answers Questions 100% of the time;able to follow single-step instructions   Personal Safety and Judgment intact   Pain Assessment   Patient Currently in Pain No   Posture    Posture Forward head position   Range of Motion (ROM)   ROM Quick Adds No deficits were identified   Strength   Strength Comments B hip flex 4+/5, knee ext 5/5, DF 5/5   Bed  "Mobility   Bed Mobility Comments Supine to sit independent   Transfer Skills   Transfer Comments Sit to stand with SBA   Gait   Gait Comments Pt amb 2 ft bed to chair wtih no AD and SBA   Balance   Balance Comments Balance steady in standing without UE support   Sensory Examination   Sensory Perception Comments Denies numbness or tingling   General Therapy Interventions   Planned Therapy Interventions gait training;transfer training;progressive activity/exercise   Clinical Impression   Criteria for Skilled Therapeutic Intervention yes, treatment indicated   PT Diagnosis Difficulty ambulating   Influenced by the following impairments SOB, dec activity tolearnce   Functional limitations due to impairments Difficulty ambulating   Clinical Presentation Evolving/Changing   Clinical Presentation Rationale medically improving   Clinical Decision Making (Complexity) Low complexity   Therapy Frequency 5x/week   Predicted Duration of Therapy Intervention (days/wks) 1 week   Anticipated Discharge Disposition Home   Risk & Benefits of therapy have been explained Yes   Patient, Family & other staff in agreement with plan of care Yes   [facility] TM \"6 Clicks\"   2016, Trustees of [facility], under license to [employer].  All rights reserved.   6 Clicks Short Forms Basic Mobility Inpatient Short Form   [facility]  \"6 Clicks\" V.2 Basic Mobility Inpatient Short Form   1. Turning from your back to your side while in a flat bed without using bedrails? 4 - None   2. Moving from lying on your back to sitting on the side of a flat bed without using bedrails? 4 - None   3. Moving to and from a bed to a chair (including a wheelchair)? 3 - A Little   4. Standing up from a chair using your arms (e.g., wheelchair, or bedside chair)? 3 - A Little   5. To walk in hospital room? 3 - A Little   6. Climbing 3-5 steps with a railing? 3 - A Little   Basic Mobility Raw Score (Score out of 24.Lower scores equate to " lower levels of function) 20   Total Evaluation Time   Total Evaluation Time (Minutes) 15

## 2020-03-25 NOTE — PROGRESS NOTES
Spoke with ID on-call, COVID results negative. Isolation discontinued per ID, notified SANDRINE Sykes (pts bedside nurse)

## 2020-03-25 NOTE — PROGRESS NOTES
[facility]    Medicine Progress Note - Hospitalist Service       Date of Admission:  3/23/2020  Assessment & Plan   Dave Rincon is a 79 year old male with PMH significant for remote history of benign brain tumor (per patient) who presented to ER with complaints of generalized weakness, productive cough and probable left-sided pneumonia.     Left-sidedcommunity acquired  pneumonia  Generalized weakness  - Fever of 100.2, has productive cough, WBC 11.2.  - Chest x-ray with left lower lobe hazy opacity.  - Influenza negative.  - COVID-19 negative.  - Continue Rocephin and azithromycin.  - IV hydration with normal saline at 75 ml/hr.  - Monitor respiratory status closely.  - PT evaluation.  - PRN cough medications.     Remote history of benign brain tumor  - Continue PTA Depakote.        Diet: Combination Diet Regular Diet Adult    DVT Prophylaxis: Pneumatic Compression Devices  Ayon Catheter: not present  Code Status: Full Code      Disposition Plan   Expected discharge: 1-2 days pending improvement in respiratory status  Entered: Misbah Mojica MD 03/25/2020, 9:45 AM       The patient's care was discussed with the Bedside Nurse and Patient.    Misbah Mojica MD  Hospitalist Service  [facility]    ______________________________________________________________________    Interval History   Dave Rincon was seen this morning. Still feels short of breath and has a cough, similar to yesterday. Denies fevers, chest pain, nausea abdominal pain, diarrhea.    Data reviewed today: I reviewed all medications, new labs and imaging results over the last 24 hours. I personally reviewed no images or EKG's today.    Physical Exam   Vital Signs: Temp: 97.9  F (36.6  C) Temp src: Oral BP: (!) 166/85 Pulse: 77   Resp: 20 SpO2: 96 % O2 Device: Nasal cannula Oxygen Delivery: 2 LPM  Weight: 200 lbs 9.9 oz  Constitutional: awake, alert, cooperative, no apparent distress  Respiratory: coarse  at the bases, no wheezing  Cardiovascular: regular rate and rhythm, normal S1 and S2, no murmur noted  GI: normal bowel sounds, soft, non-distended, non-tender  Skin: warm, dry  Musculoskeletal: no lower extremity pitting edema present  Neurologic: awake, alert, oriented to name, place and time    Data   Recent Labs   Lab 03/25/20  0746 03/24/20  0914 03/23/20  1930   WBC 9.0 9.0 11.2*   HGB 11.9* 11.9* 13.4   * 104* 102*    147* 151   NA  --  139 135   POTASSIUM  --  4.1 4.0   CHLORIDE  --  107 103   CO2  --  27 29   BUN  --  15 16   CR  --  0.97 0.98   ANIONGAP  --  5 3   JAGDEEP  --  8.0* 8.4*   GLC  --  102* 115*     No results found for this or any previous visit (from the past 24 hour(s)).  Medications     sodium chloride 75 mL/hr at 03/24/20 1911       azithromycin  250 mg Intravenous Q24H     cefTRIAXone  2 g Intravenous Q24H     divalproex sodium extended-release  750 mg Oral BID     enoxaparin ANTICOAGULANT  40 mg Subcutaneous Q24H     sodium chloride (PF)  3 mL Intracatheter Q8H

## 2020-03-25 NOTE — CONSULTS
Care Transition Initial Assessment - RN        Met with: Patient, Family and Caregiver (for wife).    DATA   Active Problems:    Community acquired pneumonia of left lower lobe of lung (H)    Community acquired pneumonia  COVID NEGATIVE       Cognitive Status: alert and oriented.        Contact information and PCP information verified: Yes  Lives With: spouse   Living Arrangements: house     Insurance concerns: No Insurance issues identified  ASSESSMENT  I spoke with the pt's wife via the phone and she clearly has dementia and not able to remember anything I told her about the pt.  I spoke with the pt's dtg Alejandra via the phone. She lives in [place] and is frustrated she cannot be here due to current visiting and traveling restrictions.  I spoke to the pt in his room about the goal of him having a safe and solid discharge plan.  Patient currently receives the following services:  None.  Please note the pt's wife has dementia and she relies on the pt for assistance with meals preparation and the pt does all the driving.  The family just hired [employer] to come into the pt's home and work with his wife to do space clearing 12-4 daily and provide assistance with shopping and meals.  Their house is clean but it has a lot of stuff.  Noreen at [phone] is the  for [employer] and she stated they can provide a ride home for the pt as long as it was 12-4pm timeframe.    The pt's wife, dtg Alejandra, and the pt all agree the pt would benefit for Home care services of nurse/PT/OT to assess home safety for the pt.         Identified issues/concerns regarding health management: Deconditioning due to pneumonia and safe home environment due to pt's wife's dementia resulting in hoarding.    Pt does not want to use any other home care service than [facility] he stated he was born in the hospital and wants to stick with [facility].      PLAN  Financial costs for the patient include co-pays .  Patient given  options and choices for discharge yes .  Patient/family is agreeable to the plan?  Yes: as stated above all are agreed that home care services would benefit the pt and the pt only wants to use [facility] services.    Patient anticipates discharging to home with services nurse/PT/OT gordon per MD .        Patient anticipates needs for home equipment: No, Pt states he has his mothers old walker in his garage for 2 years. I would need to be assessed by PT if it is the appropriate size for the pt.  Transportation/person available to transport on day of discharge  is [employer], [phone], and have they been notified/set up TBD.  Plan/Disposition: Home   Appointments: TBD        Care  (CTS) will continue to follow as needed.        Candace Holguin RN, BSN Care Coordinator  [facility]  Mobile: [phone]

## 2020-03-25 NOTE — PLAN OF CARE
DATE & TIME: 3/25 AM                 Cognitive Concerns/ Orientation : A&Ox4   BEHAVIOR & AGGRESSION TOOL COLOR: green  CIWA SCORE: na      ABNL VS/O2: VSS on RA. Was c/o SOB improved with 1-2L oxygen  MOBILITY: Ax1 with GB and walker   PAIN MANAGMENT: denied on shift   DIET: regular diet   BOWEL/BLADDER: continent, no BM on shift. Uses urinal at bedside.   ABNL LAB/BG: see results  DRAIN/DEVICES: PIV infusing NS at 75 ml/hr  TELEMETRY RHYTHM: na  SKIN: intact, scattered bruising   TESTS/PROCEDURES: na  D/C DAY/GOALS/PLACE: home with homecare possibly tomorrow   OTHER IMPORTANT INFO:

## 2020-03-25 NOTE — PROGRESS NOTES
DATE & TIME: 3/24 8319-9906    Cognitive Concerns/ Orientation : A&Ox4   BEHAVIOR & AGGRESSION TOOL COLOR: green  CIWA SCORE: na   ABNL VS/O2: VSS on RA ex slight HTN.   MOBILITY: Ax1 with GB and walker   PAIN MANAGMENT: denied on shift   DIET: regular diet   BOWEL/BLADDER: continent, no BM on shift. Uses urinal at bedside.   ABNL LAB/BG: Hgb 11.9 down from 13.4 on previous day; no signs of bleeding   DRAIN/DEVICES: PIV infusing NS at 75 ml/hr  TELEMETRY RHYTHM: na  SKIN: intact, scattered bruising   TESTS/PROCEDURES: na  D/C DAY/GOALS/PLACE: 2-3 days, pending per MD note   OTHER IMPORTANT INFO: Coarse LS with mild congested cough, nonproductive. [place], hearing aids at home - pocket talker at bedside. Sputum sample sent on eves, results pending.

## 2020-03-26 ENCOUNTER — APPOINTMENT (OUTPATIENT)
Dept: PHYSICAL THERAPY | Facility: CLINIC | Age: 80
DRG: 195 | End: 2020-03-26
Payer: COMMERCIAL

## 2020-03-26 VITALS
HEIGHT: 71 IN | RESPIRATION RATE: 20 BRPM | TEMPERATURE: 97.1 F | HEART RATE: 77 BPM | DIASTOLIC BLOOD PRESSURE: 75 MMHG | OXYGEN SATURATION: 94 % | SYSTOLIC BLOOD PRESSURE: 162 MMHG | BODY MASS INDEX: 29.32 KG/M2 | WEIGHT: 209.44 LBS

## 2020-03-26 LAB
ANION GAP SERPL CALCULATED.3IONS-SCNC: 2 MMOL/L (ref 3–14)
BACTERIA SPEC CULT: NORMAL
BUN SERPL-MCNC: 14 MG/DL (ref 7–30)
CALCIUM SERPL-MCNC: 8.1 MG/DL (ref 8.5–10.1)
CHLORIDE SERPL-SCNC: 109 MMOL/L (ref 94–109)
CO2 SERPL-SCNC: 28 MMOL/L (ref 20–32)
CREAT SERPL-MCNC: 0.78 MG/DL (ref 0.66–1.25)
ERYTHROCYTE [DISTWIDTH] IN BLOOD BY AUTOMATED COUNT: 13.9 % (ref 10–15)
GFR SERPL CREATININE-BSD FRML MDRD: 85 ML/MIN/{1.73_M2}
GLUCOSE SERPL-MCNC: 94 MG/DL (ref 70–99)
HCT VFR BLD AUTO: 36.1 % (ref 40–53)
HGB BLD-MCNC: 11.9 G/DL (ref 13.3–17.7)
MCH RBC QN AUTO: 34.3 PG (ref 26.5–33)
MCHC RBC AUTO-ENTMCNC: 33 G/DL (ref 31.5–36.5)
MCV RBC AUTO: 104 FL (ref 78–100)
PLATELET # BLD AUTO: 180 10E9/L (ref 150–450)
POTASSIUM SERPL-SCNC: 4.2 MMOL/L (ref 3.4–5.3)
RBC # BLD AUTO: 3.47 10E12/L (ref 4.4–5.9)
SODIUM SERPL-SCNC: 139 MMOL/L (ref 133–144)
SPECIMEN SOURCE: NORMAL
WBC # BLD AUTO: 7.8 10E9/L (ref 4–11)

## 2020-03-26 PROCEDURE — 36415 COLL VENOUS BLD VENIPUNCTURE: CPT | Performed by: HOSPITALIST

## 2020-03-26 PROCEDURE — 25800030 ZZH RX IP 258 OP 636: Performed by: INTERNAL MEDICINE

## 2020-03-26 PROCEDURE — 85027 COMPLETE CBC AUTOMATED: CPT | Performed by: HOSPITALIST

## 2020-03-26 PROCEDURE — 25000128 H RX IP 250 OP 636: Performed by: HOSPITALIST

## 2020-03-26 PROCEDURE — 99239 HOSP IP/OBS DSCHRG MGMT >30: CPT | Performed by: HOSPITALIST

## 2020-03-26 PROCEDURE — 97116 GAIT TRAINING THERAPY: CPT | Mod: GP

## 2020-03-26 PROCEDURE — 25000132 ZZH RX MED GY IP 250 OP 250 PS 637: Performed by: HOSPITALIST

## 2020-03-26 PROCEDURE — 80048 BASIC METABOLIC PNL TOTAL CA: CPT | Performed by: HOSPITALIST

## 2020-03-26 RX ORDER — CEFDINIR 300 MG/1
300 CAPSULE ORAL 2 TIMES DAILY
Qty: 8 CAPSULE | Refills: 0 | Status: SHIPPED | OUTPATIENT
Start: 2020-03-26 | End: 2020-03-30

## 2020-03-26 RX ORDER — AZITHROMYCIN 250 MG/1
250 TABLET, FILM COATED ORAL AT BEDTIME
Qty: 2 TABLET | Refills: 0 | Status: SHIPPED | OUTPATIENT
Start: 2020-03-26 | End: 2020-03-28

## 2020-03-26 RX ORDER — GUAIFENESIN/DEXTROMETHORPHAN 100-10MG/5
10 SYRUP ORAL EVERY 4 HOURS PRN
Qty: 120 ML | Refills: 0 | Status: SHIPPED | OUTPATIENT
Start: 2020-03-26

## 2020-03-26 RX ADMIN — ENOXAPARIN SODIUM 40 MG: 40 INJECTION SUBCUTANEOUS at 00:10

## 2020-03-26 RX ADMIN — DIVALPROEX SODIUM 750 MG: 250 TABLET, FILM COATED, EXTENDED RELEASE ORAL at 08:22

## 2020-03-26 RX ADMIN — SODIUM CHLORIDE: 9 INJECTION, SOLUTION INTRAVENOUS at 00:22

## 2020-03-26 ASSESSMENT — ACTIVITIES OF DAILY LIVING (ADL)
ADLS_ACUITY_SCORE: 16

## 2020-03-26 ASSESSMENT — MIFFLIN-ST. JEOR: SCORE: 1677.13

## 2020-03-26 NOTE — PLAN OF CARE
PT:  Discharge Planner PT   Patient plan for discharge: Return home  Current status: Pt states he is feeling better today, denies SOB at rest. Pt was independent with bed mobility and transfers, ambulated 450 ft with FWW and SBA progressing to mod I on RA with sats 88% post and recovered to 94% with short rest break. Pt on 1L prior to session at 97% and it was removed for gait. Pt no longer needs to do stairs to the basement as his bed is being moved to the main level, this goal was discontinued.  Barriers to return to prior living situation: None  Recommendations for discharge: Return home  Rationale for recommendations: Pt has met all PT goals, will discharge from PT. Pt to continue walking in halls with nursing while admitted for activity.       Entered by: Dania March 03/26/2020 11:46 AM     Physical Therapy Discharge Summary    Reason for therapy discharge:    All goals and outcomes met, no further needs identified.    Progress towards therapy goal(s). See goals on Care Plan in [facility] electronic health record for goal details.  Goals met    Therapy recommendation(s):    No further therapy is recommended.  Walk with nursing in halls.

## 2020-03-26 NOTE — PROGRESS NOTES
Pt a/o ,vss,Up walking in halls on RA. LS diminished. Discharged with [employer] ride with meds belongings and paperwork.

## 2020-03-26 NOTE — PROGRESS NOTES
Care Coordination:  Dave is being discharged to home with services Nurse/PT/OT.  [facility] notified and orders and face to face are in place.    Follow up appointment made and added to AVS:  You have a Follow up Appointment with Dr. Langston (Dr. Wooten not available on vacation)at [facility] on Monday the 30th of March at 1PM.    Address :  [address] [place] [address] [address]  , [place]  If you have any questions about this appointment or need to reschedule it please call the clinic at Phone Number [phone]    Handoff sent to PCP due to pt receiving Home Care and to clearly communicate he is COVID negative.      [employer] , Noreen at [phone], was called and notified of pt's discharge plan and they will pick him up at door 2 at 3pm.      Dr. Mojica notified the pt's dtg via phone the discharge POC.      Candace Holguin RN, BSN Care Coordinator  [facility]  Mobile: [phone]

## 2020-03-26 NOTE — PLAN OF CARE
DATE & TIME: 3/25 from 1900 to 0730    Cognitive Concerns/ Orientation : A&O x 4, calm and cooperative for cares, [place]   BEHAVIOR & AGGRESSION TOOL COLOR: Green, Calm and cooperative for cares  CIWA SCORE: N/A   ABNL VS/O2: VSS on 1LNC saturating law 90's   MOBILITY: Up x 1 assist with walker/GB  PAIN MANAGMENT: Denied   DIET: Regular  BOWEL/BLADDER: Continent, uses urinal at bedside, voiding well.   ABNL LAB/BG: Ca 8.0, Hgb 11.9, Platelet count 147  DRAIN/DEVICES: NS infusing at 75 mL/hr  TELEMETRY RHYTHM: N/A  SKIN: scattered bruising, otherwise skin is Intact  TESTS/PROCEDURES: N/A  D/C DAY/GOALS/PLACE: Possible discharge to home 1-2 days pending in progress, Possible discharge to home tomorrow with home care.   OTHER IMPORTANT INFO: Lung sound diminished on bases, crackle on upper lobes with infrequent non-productive cough.

## 2020-03-26 NOTE — DISCHARGE SUMMARY
[facility]    Discharge Summary  Hospitalist    Date of Admission:  3/23/2020  Date of Discharge:  3/26/2020  Discharging Provider: Misbah Mojica MD  Date of Service (when I saw the patient): 03/26/20    Discharge Diagnoses   Left-sidedcommunity acquired  pneumonia  Generalized weakness   Remote history of benign brain tumor    History of Present Illness   Dave Rincon is a 79 year old male with PMH significant for remote history of benign brain tumor (per patient) who presented to ER with complaints of generalized weakness, productive cough and probable left-sided pneumonia.    Hospital Course   aDve Rincon was admitted on 3/23/2020.  The following problems were addressed during his hospitalization:    Left-sided community acquired  pneumonia  - Fever of 100.2, had productive cough, WBC 11.2.  - Chest x-ray showed left lower lobe hazy opacity.  - He was admitted and started on Rocephin and azithromycin.  - Influenza negative.  - COVID-19 negative.  - Sputum culture grew normal fidel.  - He was given IV normal saline.  - He initially required a small amount of supplemental oxygen, but that has been weaned off.  - His symptoms have improved significantly and he feels ready to discharge home.  - Discharge home today.  - Follow-up with PCP in 1-2 weeks for hospital follow-up.  - Discussed reasons to seek medical attention prior to follow-up appointment.  - I updated his wife and his daughter by phone today regarding plans for discharge this afternoon.    Generalized weakness   - He has generalized weakness and deconditioning due to pneumonia.  - He was evaluated by PT.  - He is OK to discharge home with a walker and home health care.  - He will benefit from ongoing therapies and nursing care after discharge.     Remote history of benign brain tumor  - Continued PTA Depakote.    Misbah Mojica MD    Significant Results and Procedures   - Influenza negative.  - COVID-19  negative.    Pending Results   These results will be followed up by PCP.  Unresulted Labs Ordered in the Past 30 Days of this Admission     Date and Time Order Name Status Description    3/23/2020 1927 Blood culture Preliminary     3/23/2020 1927 Blood culture Preliminary         Code Status   Full Code       Primary Care Physician   [facility]    Physical Exam   Temp: 97.1  F (36.2  C) Temp src: Oral BP: (!) 162/75 Pulse: 77   Resp: 20 SpO2: 94 % O2 Device: None (Room air) Oxygen Delivery: 1 LPM  Vitals:    03/24/20 0640 03/25/20 0648 03/26/20 0500   Weight: 91.1 kg (200 lb 13.4 oz) 91 kg (200 lb 9.9 oz) 95 kg (209 lb 7 oz)     Vital Signs with Ranges  Temp:  [97.1  F (36.2  C)-98.3  F (36.8  C)] 97.1  F (36.2  C)  Pulse:  [67-77] 77  Resp:  [18-20] 20  BP: (129-162)/(66-77) 162/75  FiO2 (%):  [1 %] 1 %  SpO2:  [88 %-97 %] 94 %  I/O last 3 completed shifts:  In: -   Out: 500 [Urine:500]    Constitutional: awake, alert, cooperative, no apparent distress  Respiratory: clear to auscultation bilaterally, no crackles or wheezing  Cardiovascular: regular rate and rhythm, normal S1 and S2, no murmur noted  GI: normal bowel sounds, soft, non-distended, non-tender  Skin: warm, dry  Musculoskeletal: no lower extremity pitting edema present  Neurologic: awake, alert, oriented to name, place and time. motor is 5 out of 5 bilaterally. sensory is intact.    Discharge Disposition   Discharged to home  Condition at discharge: Stable    Consultations This Hospital Stay   PHYSICAL THERAPY ADULT IP CONSULT  CARE TRANSITION RN/SW IP CONSULT    Time Spent on this Encounter   IMisbah MD, personally saw the patient today and spent greater than 30 minutes discharging this patient.    Discharge Orders      Home care nursing referral      Home Care PT Referral for Hospital Discharge      Home Care OT Referral for Hospital Discharge      Reason for your hospital stay    You were in the hospital due to pneumonia. We  recommend that yyou finish the course of antibiotics as noted below.     Follow-up and recommended labs and tests     Follow up with primary care provider, [facility], within 7 days for hospital follow- up.  No follow up labs or test are needed.     Activity    Your activity upon discharge: activity as tolerated with walker     MD face to face encounter    Documentation of Face to Face and Certification for Home Health Services    I certify that patient: Dave Rincon is under my care and that I, or a nurse practitioner or physician's assistant working with me, had a face-to-face encounter that meets the physician face-to-face encounter requirements with this patient on: 3/26/2020.    This encounter with the patient was in whole, or in part, for the following medical condition, which is the primary reason for home health care: deconditioning due to pneumonia.    I certify that, based on my findings, the following services are medically necessary home health services: Nursing, Occupational Therapy and Physical Therapy.    My clinical findings support the need for the above services because: Nurse is needed: To provide assessment and oversight required in the home to assure adherence to the medical plan due to: deconditioning due to pneumonia.., Occupational Therapy Services are needed to assess and treat cognitive ability and address ADL safety due to impairment in deconditioning due to pneumonia. and Physical Therapy Services are needed to assess and treat the following functional impairments: deconditioning due to pneumonia.    Further, I certify that my clinical findings support that this patient is homebound (i.e. absences from home require considerable and taxing effort and are for medical reasons or [employer] services or infrequently or of short duration when for other reasons) because: Requires assistance of another person or specialized equipment to access medical services because patient:   Requires a walker to ambulate for safety..    Based on the above findings. I certify that this patient is confined to the home and needs intermittent skilled nursing care, physical therapy and/or speech therapy.  The patient is under my care, and I have initiated the establishment of the plan of care.  This patient will be followed by a physician who will periodically review the plan of care.  Physician/Provider to provide follow up care: [facility], [facility] physician (the Medicare certified [place] provider): Misbah Mojica MD  Physician Signature: See electronic signature associated with these discharge orders.  Date: 3/26/2020     Full Code     Diet    Follow this diet upon discharge: Regular Diet Adult     Discharge Medications   Current Discharge Medication List      START taking these medications    Details   azithromycin (ZITHROMAX) 250 MG tablet Take 1 tablet (250 mg) by mouth At Bedtime for 2 days  Qty: 2 tablet, Refills: 0    Associated Diagnoses: Community acquired pneumonia of left lower lobe of lung (H)      cefdinir (OMNICEF) 300 MG capsule Take 1 capsule (300 mg) by mouth 2 times daily for 4 days  Qty: 8 capsule, Refills: 0    Associated Diagnoses: Community acquired pneumonia of left lower lobe of lung (H)      guaiFENesin-dextromethorphan (ROBITUSSIN DM) 100-10 MG/5ML syrup Take 10 mLs by mouth every 4 hours as needed for cough  Qty: 120 mL, Refills: 0    Associated Diagnoses: Community acquired pneumonia of left lower lobe of lung (H)         CONTINUE these medications which have NOT CHANGED    Details   divalproex sodium extended-release (DEPAKOTE ER) 250 MG 24 hr tablet Take 750 mg by mouth 2 times daily Last released 10/16/19 [facility] day supply [facility].   Take 3 tablets twice a day.           Allergies   No Known Allergies     Data   Most Recent 3 CBC's:  Recent Labs   Lab Test 03/26/20  0807 03/25/20  0746 03/24/20  0914   WBC 7.8 9.0 9.0   HGB 11.9* 11.9* 11.9*    * 103* 104*    162 147*      Most Recent 3 BMP's:  Recent Labs   Lab Test 03/26/20  0807 03/24/20  0914 03/23/20  1930    139 135   POTASSIUM 4.2 4.1 4.0   CHLORIDE 109 107 103   CO2 28 27 29   BUN 14 15 16   CR 0.78 0.97 0.98   ANIONGAP 2* 5 3   JAGDEEP 8.1* 8.0* 8.4*   GLC 94 102* 115*     Most Recent 6 Bacteria Isolates From Any Culture (See EPIC Reports for Culture Details):  Recent Labs   Lab Test 03/24/20  1730 03/23/20  1932 03/23/20  1930   CULT Moderate growth  Normal fidel   No growth after 3 days No growth after 3 days     Results for orders placed or performed during the hospital encounter of 03/23/20   XR Chest Port 1 View    Narrative    CHEST ONE VIEW PORTABLE   3/23/2020 8:29 PM     HISTORY:  Cough.    COMPARISON: None.      Impression    IMPRESSION: Hazy opacity in the left lower lung is suspicious for  pneumonia. The right lung is clear. Aortic calcification. No  pneumothorax. Pulmonary vascularity is within normal limits.    ANTHONY DAVIES MD

## 2020-03-29 LAB
BACTERIA SPEC CULT: NO GROWTH
BACTERIA SPEC CULT: NO GROWTH
SPECIMEN SOURCE: NORMAL
SPECIMEN SOURCE: NORMAL

## 2020-04-01 LAB
COVID-19 VIRUS PCR RESULT FROM MDH: NEGATIVE
LAB SCANNED RESULT: NORMAL

## 2024-01-05 ENCOUNTER — APPOINTMENT (RX ONLY)
Dept: URBAN - METROPOLITAN AREA CLINIC 63 | Facility: CLINIC | Age: 84
Setting detail: DERMATOLOGY
End: 2024-01-05

## 2024-01-05 DIAGNOSIS — D22 MELANOCYTIC NEVI: ICD-10-CM

## 2024-01-05 DIAGNOSIS — L91.8 OTHER HYPERTROPHIC DISORDERS OF THE SKIN: ICD-10-CM

## 2024-01-05 DIAGNOSIS — L82.1 OTHER SEBORRHEIC KERATOSIS: ICD-10-CM

## 2024-01-05 DIAGNOSIS — L57.0 ACTINIC KERATOSIS: ICD-10-CM

## 2024-01-05 DIAGNOSIS — L57.8 OTHER SKIN CHANGES DUE TO CHRONIC EXPOSURE TO NONIONIZING RADIATION: ICD-10-CM

## 2024-01-05 DIAGNOSIS — D18.0 HEMANGIOMA: ICD-10-CM

## 2024-01-05 DIAGNOSIS — D485 NEOPLASM OF UNCERTAIN BEHAVIOR OF SKIN: ICD-10-CM | Status: WORSENING

## 2024-01-05 PROBLEM — D18.01 HEMANGIOMA OF SKIN AND SUBCUTANEOUS TISSUE: Status: ACTIVE | Noted: 2024-01-05

## 2024-01-05 PROBLEM — D48.5 NEOPLASM OF UNCERTAIN BEHAVIOR OF SKIN: Status: ACTIVE | Noted: 2024-01-05

## 2024-01-05 PROBLEM — D22.5 MELANOCYTIC NEVI OF TRUNK: Status: ACTIVE | Noted: 2024-01-05

## 2024-01-05 PROCEDURE — ? COUNSELING

## 2024-01-05 PROCEDURE — ? OTHER

## 2024-01-05 PROCEDURE — 17004 DESTROY PREMAL LESIONS 15/>: CPT

## 2024-01-05 PROCEDURE — 99203 OFFICE O/P NEW LOW 30 MIN: CPT | Mod: 25

## 2024-01-05 PROCEDURE — ? BIOPSY BY SHAVE METHOD

## 2024-01-05 PROCEDURE — ? LIQUID NITROGEN

## 2024-01-05 PROCEDURE — 11102 TANGNTL BX SKIN SINGLE LES: CPT | Mod: 59

## 2024-01-05 ASSESSMENT — LOCATION DETAILED DESCRIPTION DERM
LOCATION DETAILED: RIGHT LATERAL CANTHUS
LOCATION DETAILED: RIGHT SUPERIOR UPPER BACK
LOCATION DETAILED: RIGHT INFERIOR UPPER BACK
LOCATION DETAILED: RIGHT INFERIOR LATERAL FOREHEAD
LOCATION DETAILED: LEFT ULNAR DORSAL HAND
LOCATION DETAILED: LEFT PROXIMAL RADIAL DORSAL FOREARM
LOCATION DETAILED: RIGHT RIB CAGE
LOCATION DETAILED: LEFT DISTAL DORSAL FOREARM
LOCATION DETAILED: RIGHT FOREHEAD
LOCATION DETAILED: LEFT RADIAL DORSAL HAND
LOCATION DETAILED: RIGHT CENTRAL FRONTAL SCALP
LOCATION DETAILED: LEFT PROXIMAL DORSAL FOREARM
LOCATION DETAILED: RIGHT SUPERIOR LATERAL FOREHEAD
LOCATION DETAILED: RIGHT CENTRAL TEMPLE
LOCATION DETAILED: RIGHT LATERAL FOREHEAD
LOCATION DETAILED: RIGHT SUPERIOR FOREHEAD
LOCATION DETAILED: RIGHT INFERIOR FOREHEAD
LOCATION DETAILED: LEFT LATERAL SUPERIOR CHEST

## 2024-01-05 ASSESSMENT — LOCATION SIMPLE DESCRIPTION DERM
LOCATION SIMPLE: CHEST
LOCATION SIMPLE: LEFT FOREARM
LOCATION SIMPLE: RIGHT SCALP
LOCATION SIMPLE: RIGHT UPPER BACK
LOCATION SIMPLE: LEFT HAND
LOCATION SIMPLE: RIGHT FOREHEAD
LOCATION SIMPLE: RIGHT EYELID
LOCATION SIMPLE: SCALP
LOCATION SIMPLE: ABDOMEN
LOCATION SIMPLE: RIGHT TEMPLE

## 2024-01-05 ASSESSMENT — LOCATION ZONE DERM
LOCATION ZONE: SCALP
LOCATION ZONE: ARM
LOCATION ZONE: HAND
LOCATION ZONE: FACE
LOCATION ZONE: EYELID
LOCATION ZONE: TRUNK

## 2024-01-05 NOTE — PROCEDURE: OTHER
Render Risk Assessment In Note?: no
Note Text (......Xxx Chief Complaint.): This diagnosis correlates with the
Detail Level: Zone
Other (Free Text): Courtesy clipping today

## 2024-01-05 NOTE — PROCEDURE: BIOPSY BY SHAVE METHOD
Detail Level: Detailed
Depth Of Biopsy: dermis
Was A Bandage Applied: Yes
Size Of Lesion In Cm: 0
Biopsy Type: H and E
Biopsy Method: Dermablade
Anesthesia Type: 1% lidocaine with epinephrine
Anesthesia Volume In Cc: 0.5
Hemostasis: Drysol
Wound Care: Petrolatum
Dressing: bandage
Destruction After The Procedure: No
Type Of Destruction Used: Curettage
Curettage Text: The wound bed was treated with curettage after the biopsy was performed.
Cryotherapy Text: The wound bed was treated with cryotherapy after the biopsy was performed.
Electrodesiccation Text: The wound bed was treated with electrodesiccation after the biopsy was performed.
Electrodesiccation And Curettage Text: The wound bed was treated with electrodesiccation and curettage after the biopsy was performed.
Silver Nitrate Text: The wound bed was treated with silver nitrate after the biopsy was performed.
Lab: [id number]
Lab Facility: [id number]
Consent: Written consent was obtained and risks were reviewed including but not limited to scarring, infection, bleeding, scabbing, incomplete removal, nerve damage and allergy to anesthesia.
Post-Care Instructions: I reviewed with the patient in detail post-care instructions. Patient is to keep the biopsy site dry overnight, and then apply bacitracin twice daily until healed. Patient may apply hydrogen peroxide soaks to remove any crusting.
Notification Instructions: Patient will be notified of biopsy results. However, patient instructed to call the office if not contacted within 2 weeks.
Billing Type: Third-Party Bill
Information: Selecting Yes will display possible errors in your note based on the variables you have selected. This validation is only offered as a suggestion for you. PLEASE NOTE THAT THE VALIDATION TEXT WILL BE REMOVED WHEN YOU FINALIZE YOUR NOTE. IF YOU WANT TO FAX A PRELIMINARY NOTE YOU WILL NEED TO TOGGLE THIS TO 'NO' IF YOU DO NOT WANT IT IN YOUR FAXED NOTE.

## 2024-04-05 ENCOUNTER — APPOINTMENT (RX ONLY)
Dept: URBAN - METROPOLITAN AREA CLINIC 63 | Facility: CLINIC | Age: 84
Setting detail: DERMATOLOGY
End: 2024-04-05

## 2024-04-05 DIAGNOSIS — L57.8 OTHER SKIN CHANGES DUE TO CHRONIC EXPOSURE TO NONIONIZING RADIATION: ICD-10-CM

## 2024-04-05 DIAGNOSIS — L91.8 OTHER HYPERTROPHIC DISORDERS OF THE SKIN: ICD-10-CM

## 2024-04-05 DIAGNOSIS — L57.0 ACTINIC KERATOSIS: ICD-10-CM

## 2024-04-05 PROCEDURE — 17003 DESTRUCT PREMALG LES 2-14: CPT

## 2024-04-05 PROCEDURE — ? OTHER

## 2024-04-05 PROCEDURE — 17000 DESTRUCT PREMALG LESION: CPT

## 2024-04-05 PROCEDURE — 99213 OFFICE O/P EST LOW 20 MIN: CPT | Mod: 25

## 2024-04-05 PROCEDURE — ? COUNSELING

## 2024-04-05 PROCEDURE — ? LIQUID NITROGEN

## 2024-04-05 ASSESSMENT — LOCATION ZONE DERM
LOCATION ZONE: NECK
LOCATION ZONE: FACE
LOCATION ZONE: SCALP

## 2024-04-05 ASSESSMENT — LOCATION DETAILED DESCRIPTION DERM
LOCATION DETAILED: RIGHT LATERAL FOREHEAD
LOCATION DETAILED: LEFT SUPERIOR LATERAL FOREHEAD
LOCATION DETAILED: RIGHT CENTRAL FRONTAL SCALP
LOCATION DETAILED: LEFT LATERAL FOREHEAD
LOCATION DETAILED: LEFT CLAVICULAR NECK
LOCATION DETAILED: LEFT SUPERIOR FOREHEAD

## 2024-04-05 ASSESSMENT — LOCATION SIMPLE DESCRIPTION DERM
LOCATION SIMPLE: LEFT FOREHEAD
LOCATION SIMPLE: RIGHT FOREHEAD
LOCATION SIMPLE: RIGHT SCALP
LOCATION SIMPLE: LEFT ANTERIOR NECK

## 2024-04-05 NOTE — PROCEDURE: LIQUID NITROGEN
Post-Care Instructions: I reviewed with the patient in detail post-care instructions. Patient is to wear sunprotection, and avoid picking at any of the treated lesions. Pt may apply Vaseline to crusted or scabbing areas.
Duration Of Freeze Thaw-Cycle (Seconds): 0
Number Of Freeze-Thaw Cycles: 1 freeze-thaw cycle
Show Applicator Variable?: Yes
Consent: The patient's consent was obtained including but not limited to risks of crusting, scabbing, blistering, scarring, darker or lighter pigmentary change, recurrence, incomplete removal and infection.
Render Post-Care Instructions In Note?: no
Detail Level: Detailed

## 2024-04-05 NOTE — PROCEDURE: OTHER
Detail Level: Zone
Render Risk Assessment In Note?: no
Note Text (......Xxx Chief Complaint.): This diagnosis correlates with the
Other (Free Text): Courtesy clipping today

## 2024-10-04 ENCOUNTER — APPOINTMENT (RX ONLY)
Dept: URBAN - METROPOLITAN AREA CLINIC 63 | Facility: CLINIC | Age: 84
Setting detail: DERMATOLOGY
End: 2024-10-04

## 2024-10-04 DIAGNOSIS — L57.0 ACTINIC KERATOSIS: ICD-10-CM

## 2024-10-04 DIAGNOSIS — D22 MELANOCYTIC NEVI: ICD-10-CM

## 2024-10-04 DIAGNOSIS — L82.1 OTHER SEBORRHEIC KERATOSIS: ICD-10-CM

## 2024-10-04 DIAGNOSIS — D18.0 HEMANGIOMA: ICD-10-CM

## 2024-10-04 DIAGNOSIS — L57.8 OTHER SKIN CHANGES DUE TO CHRONIC EXPOSURE TO NONIONIZING RADIATION: ICD-10-CM

## 2024-10-04 PROBLEM — D18.01 HEMANGIOMA OF SKIN AND SUBCUTANEOUS TISSUE: Status: ACTIVE | Noted: 2024-10-04

## 2024-10-04 PROBLEM — D22.5 MELANOCYTIC NEVI OF TRUNK: Status: ACTIVE | Noted: 2024-10-04

## 2024-10-04 PROCEDURE — 17003 DESTRUCT PREMALG LES 2-14: CPT

## 2024-10-04 PROCEDURE — 17000 DESTRUCT PREMALG LESION: CPT

## 2024-10-04 PROCEDURE — ? LIQUID NITROGEN

## 2024-10-04 PROCEDURE — 99213 OFFICE O/P EST LOW 20 MIN: CPT | Mod: 25

## 2024-10-04 PROCEDURE — ? COUNSELING

## 2024-10-04 ASSESSMENT — LOCATION ZONE DERM
LOCATION ZONE: SCALP
LOCATION ZONE: ARM
LOCATION ZONE: FACE
LOCATION ZONE: TRUNK

## 2024-10-04 ASSESSMENT — LOCATION DETAILED DESCRIPTION DERM
LOCATION DETAILED: RIGHT SUPERIOR FOREHEAD
LOCATION DETAILED: LEFT SUPERIOR FOREHEAD
LOCATION DETAILED: RIGHT PROXIMAL POSTERIOR UPPER ARM
LOCATION DETAILED: LEFT DISTAL RADIAL DORSAL FOREARM
LOCATION DETAILED: RIGHT PROXIMAL ULNAR DORSAL FOREARM
LOCATION DETAILED: LEFT PROXIMAL POSTERIOR UPPER ARM
LOCATION DETAILED: LEFT SUPERIOR LATERAL FOREHEAD
LOCATION DETAILED: LEFT FOREHEAD
LOCATION DETAILED: EPIGASTRIC SKIN
LOCATION DETAILED: RIGHT SUPERIOR POSTERIOR PARIETAL SCALP
LOCATION DETAILED: INFERIOR THORACIC SPINE
LOCATION DETAILED: RIGHT SUPERIOR MEDIAL MIDBACK
LOCATION DETAILED: LEFT MEDIAL INFERIOR CHEST
LOCATION DETAILED: LEFT MEDIAL UPPER BACK
LOCATION DETAILED: LEFT CLAVICULAR SKIN
LOCATION DETAILED: MID-OCCIPITAL SCALP

## 2024-10-04 ASSESSMENT — LOCATION SIMPLE DESCRIPTION DERM
LOCATION SIMPLE: POSTERIOR SCALP
LOCATION SIMPLE: RIGHT LOWER BACK
LOCATION SIMPLE: LEFT UPPER ARM
LOCATION SIMPLE: CHEST
LOCATION SIMPLE: RIGHT UPPER ARM
LOCATION SIMPLE: LEFT FOREARM
LOCATION SIMPLE: ABDOMEN
LOCATION SIMPLE: UPPER BACK
LOCATION SIMPLE: LEFT FOREHEAD
LOCATION SIMPLE: LEFT UPPER BACK
LOCATION SIMPLE: RIGHT FOREHEAD
LOCATION SIMPLE: LEFT CLAVICULAR SKIN
LOCATION SIMPLE: RIGHT FOREARM

## 2025-02-13 ENCOUNTER — APPOINTMENT (OUTPATIENT)
Dept: URBAN - METROPOLITAN AREA CLINIC 63 | Facility: CLINIC | Age: 85
Setting detail: DERMATOLOGY
End: 2025-02-13

## 2025-02-13 DIAGNOSIS — B02.9 ZOSTER WITHOUT COMPLICATIONS: ICD-10-CM

## 2025-02-13 DIAGNOSIS — B35.4 TINEA CORPORIS: ICD-10-CM

## 2025-02-13 PROCEDURE — ? COUNSELING

## 2025-02-13 PROCEDURE — 99213 OFFICE O/P EST LOW 20 MIN: CPT

## 2025-02-13 PROCEDURE — ? PRESCRIPTION

## 2025-02-13 RX ORDER — VALACYCLOVIR 1 G/1
TABLET, FILM COATED ORAL TID
Qty: 21 | Refills: 0 | Status: ERX | COMMUNITY
Start: 2025-02-13

## 2025-02-13 RX ADMIN — VALACYCLOVIR: 1 TABLET, FILM COATED ORAL at 00:00

## 2025-02-13 ASSESSMENT — LOCATION ZONE DERM: LOCATION ZONE: LEG

## 2025-02-13 ASSESSMENT — LOCATION SIMPLE DESCRIPTION DERM: LOCATION SIMPLE: L3 RIGHT ANTERIOR DERMATOME

## 2025-02-13 ASSESSMENT — LOCATION DETAILED DESCRIPTION DERM: LOCATION DETAILED: L3 RIGHT ANTERIOR DERMATOME

## 2025-06-27 ENCOUNTER — APPOINTMENT (OUTPATIENT)
Dept: URBAN - METROPOLITAN AREA CLINIC 63 | Facility: CLINIC | Age: 85
Setting detail: DERMATOLOGY
End: 2025-06-27

## 2025-06-27 DIAGNOSIS — D485 NEOPLASM OF UNCERTAIN BEHAVIOR OF SKIN: ICD-10-CM | Status: INADEQUATELY CONTROLLED

## 2025-06-27 DIAGNOSIS — L57.8 OTHER SKIN CHANGES DUE TO CHRONIC EXPOSURE TO NONIONIZING RADIATION: ICD-10-CM

## 2025-06-27 DIAGNOSIS — B02.9 ZOSTER WITHOUT COMPLICATIONS: ICD-10-CM | Status: RESOLVED

## 2025-06-27 PROBLEM — D48.5 NEOPLASM OF UNCERTAIN BEHAVIOR OF SKIN: Status: ACTIVE | Noted: 2025-06-27

## 2025-06-27 PROCEDURE — ? BIOPSY BY SHAVE METHOD

## 2025-06-27 PROCEDURE — ? PRESCRIPTION MEDICATION MANAGEMENT

## 2025-06-27 PROCEDURE — ? COUNSELING

## 2025-06-27 ASSESSMENT — LOCATION DETAILED DESCRIPTION DERM
LOCATION DETAILED: LEFT PROXIMAL DORSAL FOREARM
LOCATION DETAILED: RIGHT ANTERIOR DISTAL THIGH
LOCATION DETAILED: LEFT PROXIMAL PRETIBIAL REGION
LOCATION DETAILED: RIGHT FOREHEAD
LOCATION DETAILED: RIGHT PROXIMAL DORSAL FOREARM
LOCATION DETAILED: RIGHT MEDIAL MALAR CHEEK
LOCATION DETAILED: LEFT MEDIAL FOREHEAD
LOCATION DETAILED: RIGHT PROXIMAL PRETIBIAL REGION

## 2025-06-27 ASSESSMENT — LOCATION SIMPLE DESCRIPTION DERM
LOCATION SIMPLE: RIGHT FOREHEAD
LOCATION SIMPLE: LEFT FOREHEAD
LOCATION SIMPLE: RIGHT CHEEK
LOCATION SIMPLE: LEFT PRETIBIAL REGION
LOCATION SIMPLE: RIGHT FOREARM
LOCATION SIMPLE: RIGHT PRETIBIAL REGION
LOCATION SIMPLE: RIGHT THIGH
LOCATION SIMPLE: LEFT FOREARM

## 2025-06-27 ASSESSMENT — LOCATION ZONE DERM
LOCATION ZONE: LEG
LOCATION ZONE: ARM
LOCATION ZONE: FACE

## 2025-06-27 NOTE — PROCEDURE: PRESCRIPTION MEDICATION MANAGEMENT
Discontinue Regimen: valacyclovir 1 gram tablet TID\\nQuantity: 21.0 Tablet\\nSig: Take three tablets by mouth daily for 7 days
Detail Level: Zone
Render In Strict Bullet Format?: No

## 2025-06-27 NOTE — PROCEDURE: BIOPSY BY SHAVE METHOD
Detail Level: Detailed
Depth Of Biopsy: dermis
Was A Bandage Applied: Yes
Size Of Lesion In Cm: 0.2
X Size Of Lesion In Cm: 0
Biopsy Type: H and E
Biopsy Method: Dermablade
Anesthesia Type: 1% lidocaine with epinephrine
Anesthesia Volume In Cc: 0.5
Hemostasis: Drysol
Wound Care: Petrolatum
Dressing: bandage
Destruction After The Procedure: No
Type Of Destruction Used: Curettage
Curettage Text: The wound bed was treated with curettage after the biopsy was performed.
Cryotherapy Text: The wound bed was treated with cryotherapy after the biopsy was performed.
Electrodesiccation Text: The wound bed was treated with electrodesiccation after the biopsy was performed.
Electrodesiccation And Curettage Text: The wound bed was treated with electrodesiccation and curettage after the biopsy was performed.
Silver Nitrate Text: The wound bed was treated with silver nitrate after the biopsy was performed.
Lab: [id number]
Lab Facility: [id number]
Medical Necessity Information: It is in your best interest to select a reason for this procedure from the list below. All of these items fulfill various CMS LCD requirements except the new and changing color options.
Consent: Written consent was obtained and risks were reviewed including but not limited to scarring, infection, bleeding, scabbing, incomplete removal, nerve damage and allergy to anesthesia.
Post-Care Instructions: I reviewed with the patient in detail post-care instructions. Patient is to keep the biopsy site dry overnight, and then apply bacitracin twice daily until healed. Patient may apply hydrogen peroxide soaks to remove any crusting.
Notification Instructions: Patient will be notified of biopsy results. However, patient instructed to call the office if not contacted within 2 weeks.
Billing Type: Third-Party Bill
Information: Selecting Yes will display possible errors in your note based on the variables you have selected. This validation is only offered as a suggestion for you. PLEASE NOTE THAT THE VALIDATION TEXT WILL BE REMOVED WHEN YOU FINALIZE YOUR NOTE. IF YOU WANT TO FAX A PRELIMINARY NOTE YOU WILL NEED TO TOGGLE THIS TO 'NO' IF YOU DO NOT WANT IT IN YOUR FAXED NOTE.
Size Of Lesion In Cm: 0.3

## 2025-07-11 ENCOUNTER — APPOINTMENT (OUTPATIENT)
Dept: URBAN - METROPOLITAN AREA CLINIC 63 | Facility: CLINIC | Age: 85
Setting detail: DERMATOLOGY
End: 2025-07-11

## 2025-07-11 DIAGNOSIS — L57.0 ACTINIC KERATOSIS: ICD-10-CM

## 2025-07-11 DIAGNOSIS — B07.8 OTHER VIRAL WARTS: ICD-10-CM

## 2025-07-11 PROCEDURE — ? LIQUID NITROGEN

## 2025-07-11 ASSESSMENT — LOCATION SIMPLE DESCRIPTION DERM
LOCATION SIMPLE: NOSE
LOCATION SIMPLE: SCALP
LOCATION SIMPLE: RIGHT THIGH
LOCATION SIMPLE: RIGHT FOREHEAD
LOCATION SIMPLE: LEFT EAR
LOCATION SIMPLE: LEFT FOREHEAD

## 2025-07-11 ASSESSMENT — LOCATION ZONE DERM
LOCATION ZONE: LEG
LOCATION ZONE: EAR
LOCATION ZONE: FACE
LOCATION ZONE: NOSE
LOCATION ZONE: SCALP

## 2025-07-11 ASSESSMENT — LOCATION DETAILED DESCRIPTION DERM
LOCATION DETAILED: LEFT SUPERIOR CRUS OF ANTIHELIX
LOCATION DETAILED: RIGHT ANTERIOR DISTAL THIGH
LOCATION DETAILED: LEFT CENTRAL PARIETAL SCALP
LOCATION DETAILED: NASAL TIP
LOCATION DETAILED: RIGHT SUPERIOR LATERAL FOREHEAD
LOCATION DETAILED: RIGHT MEDIAL FOREHEAD
LOCATION DETAILED: LEFT LATERAL FOREHEAD
LOCATION DETAILED: RIGHT SUPERIOR FOREHEAD

## 2025-07-11 NOTE — PROCEDURE: LIQUID NITROGEN
Duration Of Freeze Thaw-Cycle (Seconds): 0
Render Note In Bullet Format When Appropriate: No
Number Of Freeze-Thaw Cycles: 1 freeze-thaw cycle
Post-Care Instructions: I reviewed with the patient in detail post-care instructions. Patient is to wear sunprotection, and avoid picking at any of the treated lesions. Pt may apply Vaseline to crusted or scabbing areas.
Consent: The patient's consent was obtained including but not limited to risks of crusting, scabbing, blistering, scarring, darker or lighter pigmentary change, recurrence, incomplete removal and infection.
Show Aperture Variable?: Yes
Detail Level: Detailed
Spray Paint Text: The liquid nitrogen was applied to the skin utilizing a spray paint frosting technique.
Medical Necessity Information: It is in your best interest to select a reason for this procedure from the list below. All of these items fulfill various CMS LCD requirements except the new and changing color options.
Pared With?: 15 blade
Number Of Freeze-Thaw Cycles: 2 freeze-thaw cycles
Medical Necessity Clause: This procedure was medically necessary because the lesions that were treated were: painful, pruritic and spreading

## 2025-08-12 ENCOUNTER — APPOINTMENT (OUTPATIENT)
Dept: URBAN - METROPOLITAN AREA CLINIC 63 | Facility: CLINIC | Age: 85
Setting detail: DERMATOLOGY
End: 2025-08-12

## 2025-08-12 VITALS — DIASTOLIC BLOOD PRESSURE: 64 MMHG | SYSTOLIC BLOOD PRESSURE: 124 MMHG | HEART RATE: 76 BPM

## 2025-08-12 PROBLEM — D04.39 CARCINOMA IN SITU OF SKIN OF OTHER PARTS OF FACE: Status: ACTIVE | Noted: 2025-08-12

## 2025-08-12 PROCEDURE — ? MOHS SURGERY
